# Patient Record
Sex: MALE | Race: WHITE | Employment: FULL TIME | URBAN - METROPOLITAN AREA
[De-identification: names, ages, dates, MRNs, and addresses within clinical notes are randomized per-mention and may not be internally consistent; named-entity substitution may affect disease eponyms.]

---
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
## 2023-03-15 ENCOUNTER — APPOINTMENT (OUTPATIENT)
Dept: GENERAL RADIOLOGY | Age: 23
End: 2023-03-15
Payer: COMMERCIAL

## 2023-03-15 ENCOUNTER — HOSPITAL ENCOUNTER (INPATIENT)
Age: 23
LOS: 1 days | Discharge: HOME | End: 2023-03-16
Attending: STUDENT IN AN ORGANIZED HEALTH CARE EDUCATION/TRAINING PROGRAM | Admitting: INTERNAL MEDICINE
Payer: COMMERCIAL

## 2023-03-15 DIAGNOSIS — I48.91 ATRIAL FIBRILLATION WITH RVR (HCC): Primary | ICD-10-CM

## 2023-03-15 DIAGNOSIS — R55 NEAR SYNCOPE: ICD-10-CM

## 2023-03-15 DIAGNOSIS — R42 LIGHTHEADEDNESS: ICD-10-CM

## 2023-03-15 PROBLEM — E11.9 DIABETES MELLITUS (HCC): Status: ACTIVE | Noted: 2023-03-15

## 2023-03-15 LAB
ALBUMIN SERPL-MCNC: 5.5 G/DL (ref 3.4–5)
ALBUMIN/GLOB SERPL: 2.9 {RATIO} (ref 1.1–2.2)
ALP SERPL-CCNC: 51 U/L (ref 40–129)
ALT SERPL-CCNC: 21 U/L (ref 10–40)
ANION GAP SERPL CALCULATED.3IONS-SCNC: 14 MMOL/L (ref 3–16)
AST SERPL-CCNC: 19 U/L (ref 15–37)
BASOPHILS # BLD: 0 K/UL (ref 0–0.2)
BASOPHILS NFR BLD: 0.5 %
BILIRUB SERPL-MCNC: 0.8 MG/DL (ref 0–1)
BUN SERPL-MCNC: 10 MG/DL (ref 7–20)
CALCIUM SERPL-MCNC: 10.6 MG/DL (ref 8.3–10.6)
CHLORIDE SERPL-SCNC: 99 MMOL/L (ref 99–110)
CHOLEST SERPL-MCNC: 174 MG/DL (ref 0–199)
CO2 SERPL-SCNC: 24 MMOL/L (ref 21–32)
CREAT SERPL-MCNC: 0.8 MG/DL (ref 0.9–1.3)
D DIMER: <0.27 UG/ML FEU (ref 0–0.6)
DEPRECATED RDW RBC AUTO: 13.5 % (ref 12.4–15.4)
EKG ATRIAL RATE: 182 BPM
EKG DIAGNOSIS: NORMAL
EKG Q-T INTERVAL: 210 MS
EKG QRS DURATION: 84 MS
EKG QTC CALCULATION (BAZETT): 362 MS
EKG R AXIS: 70 DEGREES
EKG T AXIS: 236 DEGREES
EKG VENTRICULAR RATE: 179 BPM
EOSINOPHIL # BLD: 0.1 K/UL (ref 0–0.6)
EOSINOPHIL NFR BLD: 0.7 %
GFR SERPLBLD CREATININE-BSD FMLA CKD-EPI: >60 ML/MIN/{1.73_M2}
GLUCOSE BLD-MCNC: 91 MG/DL (ref 70–99)
GLUCOSE SERPL-MCNC: 140 MG/DL (ref 70–99)
HCT VFR BLD AUTO: 51.8 % (ref 40.5–52.5)
HDLC SERPL-MCNC: 59 MG/DL (ref 40–60)
HGB BLD-MCNC: 17.3 G/DL (ref 13.5–17.5)
LDLC SERPL CALC-MCNC: 92 MG/DL
LYMPHOCYTES # BLD: 2.7 K/UL (ref 1–5.1)
LYMPHOCYTES NFR BLD: 33.1 %
MCH RBC QN AUTO: 28.5 PG (ref 26–34)
MCHC RBC AUTO-ENTMCNC: 33.5 G/DL (ref 31–36)
MCV RBC AUTO: 85.1 FL (ref 80–100)
MONOCYTES # BLD: 0.5 K/UL (ref 0–1.3)
MONOCYTES NFR BLD: 6.6 %
NEUTROPHILS # BLD: 4.9 K/UL (ref 1.7–7.7)
NEUTROPHILS NFR BLD: 59.1 %
NT-PROBNP SERPL-MCNC: 33 PG/ML (ref 0–124)
PERFORMED ON: NORMAL
PLATELET # BLD AUTO: 285 K/UL (ref 135–450)
PMV BLD AUTO: 8.7 FL (ref 5–10.5)
POTASSIUM SERPL-SCNC: 3.6 MMOL/L (ref 3.5–5.1)
PROT SERPL-MCNC: 7.4 G/DL (ref 6.4–8.2)
RBC # BLD AUTO: 6.08 M/UL (ref 4.2–5.9)
SODIUM SERPL-SCNC: 137 MMOL/L (ref 136–145)
TRIGL SERPL-MCNC: 117 MG/DL (ref 0–150)
TROPONIN T SERPL-MCNC: <0.01 NG/ML
TSH SERPL DL<=0.005 MIU/L-ACNC: 2.28 UIU/ML (ref 0.27–4.2)
TSH SERPL DL<=0.005 MIU/L-ACNC: 3.12 UIU/ML (ref 0.27–4.2)
VLDLC SERPL CALC-MCNC: 23 MG/DL
WBC # BLD AUTO: 8.3 K/UL (ref 4–11)

## 2023-03-15 PROCEDURE — 36415 COLL VENOUS BLD VENIPUNCTURE: CPT

## 2023-03-15 PROCEDURE — 2580000003 HC RX 258: Performed by: STUDENT IN AN ORGANIZED HEALTH CARE EDUCATION/TRAINING PROGRAM

## 2023-03-15 PROCEDURE — 83036 HEMOGLOBIN GLYCOSYLATED A1C: CPT

## 2023-03-15 PROCEDURE — 2580000003 HC RX 258: Performed by: INTERNAL MEDICINE

## 2023-03-15 PROCEDURE — 2500000003 HC RX 250 WO HCPCS: Performed by: INTERNAL MEDICINE

## 2023-03-15 PROCEDURE — 85379 FIBRIN DEGRADATION QUANT: CPT

## 2023-03-15 PROCEDURE — 6370000000 HC RX 637 (ALT 250 FOR IP): Performed by: INTERNAL MEDICINE

## 2023-03-15 PROCEDURE — 85025 COMPLETE CBC W/AUTO DIFF WBC: CPT

## 2023-03-15 PROCEDURE — 71045 X-RAY EXAM CHEST 1 VIEW: CPT

## 2023-03-15 PROCEDURE — 83880 ASSAY OF NATRIURETIC PEPTIDE: CPT

## 2023-03-15 PROCEDURE — 2500000003 HC RX 250 WO HCPCS: Performed by: STUDENT IN AN ORGANIZED HEALTH CARE EDUCATION/TRAINING PROGRAM

## 2023-03-15 PROCEDURE — 99223 1ST HOSP IP/OBS HIGH 75: CPT | Performed by: INTERNAL MEDICINE

## 2023-03-15 PROCEDURE — 6360000002 HC RX W HCPCS: Performed by: INTERNAL MEDICINE

## 2023-03-15 PROCEDURE — 93010 ELECTROCARDIOGRAM REPORT: CPT | Performed by: INTERNAL MEDICINE

## 2023-03-15 PROCEDURE — 96374 THER/PROPH/DIAG INJ IV PUSH: CPT

## 2023-03-15 PROCEDURE — 2060000000 HC ICU INTERMEDIATE R&B

## 2023-03-15 PROCEDURE — 99285 EMERGENCY DEPT VISIT HI MDM: CPT

## 2023-03-15 PROCEDURE — 84484 ASSAY OF TROPONIN QUANT: CPT

## 2023-03-15 PROCEDURE — 84443 ASSAY THYROID STIM HORMONE: CPT

## 2023-03-15 PROCEDURE — 93005 ELECTROCARDIOGRAM TRACING: CPT | Performed by: STUDENT IN AN ORGANIZED HEALTH CARE EDUCATION/TRAINING PROGRAM

## 2023-03-15 PROCEDURE — 80053 COMPREHEN METABOLIC PANEL: CPT

## 2023-03-15 PROCEDURE — 80061 LIPID PANEL: CPT

## 2023-03-15 RX ORDER — ACETAMINOPHEN 650 MG/1
650 SUPPOSITORY RECTAL EVERY 6 HOURS PRN
Status: DISCONTINUED | OUTPATIENT
Start: 2023-03-15 | End: 2023-03-16 | Stop reason: HOSPADM

## 2023-03-15 RX ORDER — DEXTROSE MONOHYDRATE 100 MG/ML
INJECTION, SOLUTION INTRAVENOUS CONTINUOUS PRN
Status: DISCONTINUED | OUTPATIENT
Start: 2023-03-15 | End: 2023-03-16 | Stop reason: HOSPADM

## 2023-03-15 RX ORDER — SODIUM CHLORIDE 0.9 % (FLUSH) 0.9 %
5-40 SYRINGE (ML) INJECTION PRN
Status: DISCONTINUED | OUTPATIENT
Start: 2023-03-15 | End: 2023-03-16 | Stop reason: HOSPADM

## 2023-03-15 RX ORDER — ONDANSETRON 2 MG/ML
4 INJECTION INTRAMUSCULAR; INTRAVENOUS EVERY 6 HOURS PRN
Status: DISCONTINUED | OUTPATIENT
Start: 2023-03-15 | End: 2023-03-16 | Stop reason: HOSPADM

## 2023-03-15 RX ORDER — LISINOPRIL 20 MG/1
20 TABLET ORAL DAILY
Status: ON HOLD | COMMUNITY
End: 2023-03-16 | Stop reason: HOSPADM

## 2023-03-15 RX ORDER — LISINOPRIL AND HYDROCHLOROTHIAZIDE 20; 12.5 MG/1; MG/1
1 TABLET ORAL DAILY
Status: DISCONTINUED | OUTPATIENT
Start: 2023-03-15 | End: 2023-03-15

## 2023-03-15 RX ORDER — INSULIN LISPRO 100 [IU]/ML
0-8 INJECTION, SOLUTION INTRAVENOUS; SUBCUTANEOUS
Status: DISCONTINUED | OUTPATIENT
Start: 2023-03-15 | End: 2023-03-15

## 2023-03-15 RX ORDER — ACETAMINOPHEN 325 MG/1
650 TABLET ORAL EVERY 6 HOURS PRN
Status: DISCONTINUED | OUTPATIENT
Start: 2023-03-15 | End: 2023-03-16 | Stop reason: HOSPADM

## 2023-03-15 RX ORDER — INSULIN LISPRO 100 [IU]/ML
0-4 INJECTION, SOLUTION INTRAVENOUS; SUBCUTANEOUS NIGHTLY
Status: DISCONTINUED | OUTPATIENT
Start: 2023-03-15 | End: 2023-03-15

## 2023-03-15 RX ORDER — POLYETHYLENE GLYCOL 3350 17 G/17G
17 POWDER, FOR SOLUTION ORAL DAILY PRN
Status: DISCONTINUED | OUTPATIENT
Start: 2023-03-15 | End: 2023-03-16 | Stop reason: HOSPADM

## 2023-03-15 RX ORDER — ENOXAPARIN SODIUM 150 MG/ML
1 INJECTION SUBCUTANEOUS 2 TIMES DAILY
Status: DISCONTINUED | OUTPATIENT
Start: 2023-03-15 | End: 2023-03-15

## 2023-03-15 RX ORDER — DILTIAZEM HYDROCHLORIDE 5 MG/ML
10 INJECTION INTRAVENOUS ONCE
Status: COMPLETED | OUTPATIENT
Start: 2023-03-15 | End: 2023-03-15

## 2023-03-15 RX ORDER — LISINOPRIL 20 MG/1
20 TABLET ORAL DAILY
Status: DISCONTINUED | OUTPATIENT
Start: 2023-03-15 | End: 2023-03-16 | Stop reason: HOSPADM

## 2023-03-15 RX ORDER — METFORMIN HYDROCHLORIDE 500 MG/1
500 TABLET, EXTENDED RELEASE ORAL 2 TIMES DAILY WITH MEALS
Status: DISCONTINUED | OUTPATIENT
Start: 2023-03-15 | End: 2023-03-15

## 2023-03-15 RX ORDER — ONDANSETRON 4 MG/1
4 TABLET, ORALLY DISINTEGRATING ORAL EVERY 8 HOURS PRN
Status: DISCONTINUED | OUTPATIENT
Start: 2023-03-15 | End: 2023-03-16 | Stop reason: HOSPADM

## 2023-03-15 RX ORDER — SODIUM CHLORIDE 0.9 % (FLUSH) 0.9 %
5-40 SYRINGE (ML) INJECTION EVERY 12 HOURS SCHEDULED
Status: DISCONTINUED | OUTPATIENT
Start: 2023-03-15 | End: 2023-03-16 | Stop reason: HOSPADM

## 2023-03-15 RX ORDER — DILTIAZEM HYDROCHLORIDE 120 MG/1
120 CAPSULE, COATED, EXTENDED RELEASE ORAL 2 TIMES DAILY
Status: DISCONTINUED | OUTPATIENT
Start: 2023-03-15 | End: 2023-03-16 | Stop reason: HOSPADM

## 2023-03-15 RX ORDER — SODIUM CHLORIDE 9 MG/ML
INJECTION, SOLUTION INTRAVENOUS PRN
Status: DISCONTINUED | OUTPATIENT
Start: 2023-03-15 | End: 2023-03-16 | Stop reason: HOSPADM

## 2023-03-15 RX ADMIN — ENOXAPARIN SODIUM 120 MG: 150 INJECTION SUBCUTANEOUS at 20:35

## 2023-03-15 RX ADMIN — ENOXAPARIN SODIUM 120 MG: 150 INJECTION SUBCUTANEOUS at 17:03

## 2023-03-15 RX ADMIN — DILTIAZEM HYDROCHLORIDE 120 MG: 120 CAPSULE, EXTENDED RELEASE ORAL at 21:48

## 2023-03-15 RX ADMIN — SODIUM CHLORIDE 12.5 MG/HR: 900 INJECTION, SOLUTION INTRAVENOUS at 18:13

## 2023-03-15 RX ADMIN — DILTIAZEM HYDROCHLORIDE 10 MG: 5 INJECTION INTRAVENOUS at 08:54

## 2023-03-15 RX ADMIN — Medication 10 ML: at 20:35

## 2023-03-15 RX ADMIN — SODIUM CHLORIDE 5 MG/HR: 900 INJECTION, SOLUTION INTRAVENOUS at 09:21

## 2023-03-15 RX ADMIN — ACETAMINOPHEN 325MG 650 MG: 325 TABLET ORAL at 12:55

## 2023-03-15 ASSESSMENT — PAIN - FUNCTIONAL ASSESSMENT: PAIN_FUNCTIONAL_ASSESSMENT: NONE - DENIES PAIN

## 2023-03-15 ASSESSMENT — LIFESTYLE VARIABLES: HOW OFTEN DO YOU HAVE A DRINK CONTAINING ALCOHOL: NEVER

## 2023-03-15 NOTE — ED NOTES
Pt Diltiazem rate titrated 10 mg/hr. Pt still complains of episodes of mild shortness of breath and some lightheadedness when moving around. VS remain stable outside of heart rate. Pt denies needs at this time.      Leanna Majano RN  03/15/23 4191

## 2023-03-15 NOTE — PROGRESS NOTES
4 Eyes Skin Assessment     The patient is being assess for  Admission    I agree that 2 RN's have performed a thorough Head to Toe Skin Assessment on the patient. ALL assessment sites listed below have been assessed. Areas assessed by both nurses: Flex Rosalestis &  [x]   Head, Face, and Ears   [x]   Shoulders, Back, and Chest  [x]   Arms, Elbows, and Hands   [x]   Coccyx, Sacrum, and Ischum  [x]   Legs, Feet, and Heels        Does the Patient have Skin Breakdown?   No         Qamar Prevention initiated:  No   Wound Care Orders initiated:  No      [facility] nurse consulted for Pressure Injury (Stage 3,4, Unstageable, DTI, NWPT, and Complex wounds):  No      Nurse 1 eSignature: Electronically signed by Justin Uribe RN on 3/15/23 at 3:55 PM EDT    **SHARE this note so that the co-signing nurse is able to place an eSignature**    Nurse 2 eSignature: Electronically signed by Anna Ahumada, RN on 3/15/23 at 7:31 PM EDT

## 2023-03-15 NOTE — ED PROVIDER NOTES
Emergency Department Provider Note  Location: [address] [place] [address]  ED  3/15/2023     Patient Identification  Ivan Orozco is a 23 y.o. male    Chief Complaint  Dizziness (Woke up with palpitations and dizziness, feeling sob, went to bed feeling fine)      Mode of Arrival  private car    HPI  (History provided by patient)  This is a 23 y.o. male with a PMH significant for diabetes, hypertension  presented today for palpitations and dizziness. Patient reports that symptoms started around 7:00 when he woke up this morning. States that his heart rate felt irregular. He reports presyncopal symptoms but denies any syncope. He is endorsing some chest pressure but denies any shortness of breath. States that dizziness described as lightheadedness. He denies any recent fever, cough, abdominal pain, vomiting, or changes to bowel or bladder. Patient reports that he smoked marijuana earlier this evening which is the only thing that is changed. He also reports increasing stress at home. ROS  Review of Systems   All other systems reviewed and are negative. I have reviewed the following nursing documentation:  Allergies: Allergies   Allergen Reactions    Pistachio [Macadamia Nut Oil] Hives    Topiramate     Lansoprazole Rash       Past medical history:  has a past medical history of Diabetes mellitus ([address] and Hypertension. Past surgical history:  has a past surgical history that includes fracture surgery; Superior tooth extraction; and Gastric Band. Home medications:   Prior to Admission medications    Medication Sig Start Date End Date Taking?  Authorizing Provider   lisinopril-hydroCHLOROthiazide (PRINZIDE;ZESTORETIC) 20-12.5 MG per tablet  3/17/21   Historical Provider, MD   metFORMIN (GLUCOPHAGE-XR) 500 MG extended release tablet  3/7/21   Historical Provider, MD   omeprazole (PRILOSEC) 20 MG delayed release capsule Take 20 mg by mouth 2 times daily 1/20/21 1/20/22  Historical Provider, MD ondansetron (ZOFRAN ODT) 4 MG disintegrating tablet Take 1 tablet by mouth every 8 hours as needed for Nausea 3/25/21   Bhargavi Pan APRN - CNP   ibuprofen (ADVIL;MOTRIN) 800 MG tablet Take 1 tablet by mouth every 6 hours as needed for Pain 9/22/16 9/27/16  Marylene Cree, MD       Social history:  reports that he has never smoked. He has never used smokeless tobacco. He reports that he does not drink alcohol and does not use drugs. Family history:  History reviewed. No pertinent family history. Exam  ED Triage Vitals   BP Temp Temp src Pulse Resp SpO2 Height Weight   -- -- -- -- -- -- -- --     Physical Exam  Vitals and nursing note reviewed. Constitutional:       General: He is not in acute distress. HENT:      Head: Normocephalic and atraumatic. Right Ear: External ear normal.      Left Ear: External ear normal.      Nose: Nose normal.      Mouth/Throat:      Pharynx: Oropharynx is clear. Eyes:      Conjunctiva/sclera: Conjunctivae normal.   Cardiovascular:      Rate and Rhythm: Tachycardia present. Rhythm irregular. Pulses: Normal pulses. Heart sounds: Murmur heard. Pulmonary:      Effort: Pulmonary effort is normal.      Breath sounds: Normal breath sounds. Abdominal:      General: There is no distension. Palpations: Abdomen is soft. Tenderness: There is no abdominal tenderness. There is no guarding or rebound. Musculoskeletal:         General: Normal range of motion. Cervical back: Neck supple. No rigidity. Skin:     General: Skin is warm. Capillary Refill: Capillary refill takes less than 2 seconds. Neurological:      General: No focal deficit present. Mental Status: He is alert. Cranial Nerves: No cranial nerve deficit.    Psychiatric:         Mood and Affect: Mood normal.         Behavior: Behavior normal.           MDM/ED Course  ED Medication Orders (From admission, onward)      Start Ordered     Status Ordering Provider    03/15/23 0900 03/15/23 0848  dilTIAZem injection 10 mg  ONCE        See Hyperspace for full Linked Orders Report. Last MAR action: Given - by Alexis Burks on 03/15/23 at [address]    03/15/23 0900 03/15/23 0848  dilTIAZem 125 mg in sodium chloride 0.9 % 125 mL infusion  CONTINUOUS        Question Answer Comment   Titrate Infusion? Yes    Initial Infusion Dose: 5 mg/hr    Goal of Therapy is: HR less than 120 bpm    Contact Provider if: SBP less than 90 mmHg    Contact Provider if: HR less than 60 bpm    HOLD for SBP less than 90 mmHg    HOLD for HR less than 60 bpm       See Hyperspace for full Linked Orders Report. Last MAR action: Canceled Entry - by Alexis Burks on 03/15/23 at [address]    03/15/23 0900 03/15/23 0858  dilTIAZem 100 mg in sodium chloride 0.9 % 100 mL infusion (ADD-[place])  CONTINUOUS        Question Answer Comment   Titrate Infusion? Yes    Initial Infusion Dose: 5 mg/hr    Goal of Therapy is: HR less than 120 bpm    Contact Provider if: SBP less than 90 mmHg    Contact Provider if: HR less than 60 bpm    HOLD for SBP less than 90 mmHg    HOLD for HR less than 60 bpm        Last MAR action: Rate/Dose Change - by Alexis Burks on 03/15/23 at [address]            EKG    Interpreted the EKG and note atrial fibrillation with rapid ventricular response, rate of 179, normal QRS and QTc, ST and T wave abnormalities in the inferior lateral leads. Radiology  XR CHEST PORTABLE    Result Date: 3/15/2023  EXAMINATION: ONE XRAY VIEW OF THE CHEST 3/15/2023 8:50 am COMPARISON: None. HISTORY: ORDERING SYSTEM PROVIDED HISTORY: palpitations TECHNOLOGIST PROVIDED HISTORY: Reason for exam:->palpitations Reason for Exam: palpitations, dizziness, near syncope, pre-existing heart conditions FINDINGS: Normal cardiomediastinal silhouette. No acute airspace infiltrate.   No pneumothorax or pleural effusion     No acute cardiopulmonary findings        Labs  Results for orders placed or performed during the hospital encounter of 03/15/23   CBC with Auto Differential   Result Value Ref Range    WBC 8.3 4.0 - 11.0 K/uL    RBC 6.08 (H) 4.20 - 5.90 M/uL    Hemoglobin 17.3 13.5 - 17.5 g/dL    Hematocrit 51.8 40.5 - 52.5 %    MCV 85.1 80.0 - 100.0 fL    MCH 28.5 26.0 - 34.0 pg    MCHC 33.5 31.0 - 36.0 g/dL    RDW 13.5 12.4 - 15.4 %    Platelets 083 873 - 193 K/uL    MPV 8.7 5.0 - 10.5 fL    Neutrophils % 59.1 %    Lymphocytes % 33.1 %    Monocytes % 6.6 %    Eosinophils % 0.7 %    Basophils % 0.5 %    Neutrophils Absolute 4.9 1.7 - 7.7 K/uL    Lymphocytes Absolute 2.7 1.0 - 5.1 K/uL    Monocytes Absolute 0.5 0.0 - 1.3 K/uL    Eosinophils Absolute 0.1 0.0 - 0.6 K/uL    Basophils Absolute 0.0 0.0 - 0.2 K/uL   Comprehensive Metabolic Panel   Result Value Ref Range    Sodium 137 136 - 145 mmol/L    Potassium 3.6 3.5 - 5.1 mmol/L    Chloride 99 99 - 110 mmol/L    CO2 24 21 - 32 mmol/L    Anion Gap 14 3 - 16    Glucose 140 (H) 70 - 99 mg/dL    BUN 10 7 - 20 mg/dL    Creatinine 0.8 (L) 0.9 - 1.3 mg/dL    Est, Glom Filt Rate >60 >60    Calcium 10.6 8.3 - 10.6 mg/dL    Total Protein 7.4 6.4 - 8.2 g/dL    Albumin 5.5 (H) 3.4 - 5.0 g/dL    Albumin/Globulin Ratio 2.9 (H) 1.1 - 2.2    Total Bilirubin 0.8 0.0 - 1.0 mg/dL    Alkaline Phosphatase 51 40 - 129 U/L    ALT 21 10 - 40 U/L    AST 19 15 - 37 U/L   Troponin   Result Value Ref Range    Troponin <0.01 <0.01 ng/mL   Brain Natriuretic Peptide   Result Value Ref Range    Pro-BNP 33 0 - 124 pg/mL   D-Dimer, Quantitative   Result Value Ref Range    D-Dimer, Quant <0.27 0.00 - 0.60 ug/mL FEU   EKG 12 Lead   Result Value Ref Range    Ventricular Rate 179 BPM    Atrial Rate 182 BPM    QRS Duration 84 ms    Q-T Interval 210 ms    QTc Calculation (Bazett) 362 ms    R Axis 70 degrees    T Axis 236 degrees    Diagnosis       Atrial fibrillation with rapid ventricular responseST & T wave abnormality, consider inferolateral ischemia or digitalis effectAbnormal ECGNo previous ECGs available         - Patient seen and evaluated in room 02.  23 y.o. male presented for palpitations and dizziness. Patient presented tachycardic to 181, normotensive, afebrile, normal respiratory rate and O2 saturation. On exam he had an irregular rhythm and was tachycardic. Given history and exam my differential diagnosis includes but is not limited to ACS, cardiomyopathy, CHF, pneumonia, pleural effusion, dehydration, electrolyte abnormalities, anemia, medication side effect. He has no ripping or tearing chest pain to the disability acute aortic dissection. I also considered underlying PE with D-dimer pending.  - Patient was placed on telemetry during his ED stay. He was initially found to be in atrial fibrillation.  - Pertinent old records reviewed. Patient is followed by Dr. Christofer Morales at [facility] cardiology. 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 He has access to transportation. Patient has a primary care provider  - Patient was given diltiazem bolus and infusion in the ED.   Upon reassessment, patient improved heart rate to 123.    - Diagnostic studies reviewed and interpreted by me   - EKG atrial fibrillation with rapid ventricular response, rate of 179, normal QRS and QTc, ST and T wave abnormalities in the inferior lateral leads.   - CXR    - Lab:    CBC with a normal white blood cell count, no evidence of anemia, normal platelets  CMP with normal electrolytes, normal renal function, normal LFTs and bilirubin  Normal proBNP  Troponin negative  D-dimer within normal limits    I discussed case with Dr. Radha Pelaez with [facility] cardiology. He was okay with admission here at [facility]. No further medication recommended. - I discussed the results with patient and family member patient and mother. We agreed to admit for acute onset atrial fibrillation with rapid ventricular response. Clinical Impression:  1. Atrial fibrillation with RVR ([address]    2. Near syncope    3. Lightheadedness          Disposition:  Admit to telemetry in stable condition. Blood pressure (!) 124/90, pulse (!) 128, temperature 97 °F (36.1 °C), temperature source Oral, resp. rate 18, weight 255 lb (115.7 kg), SpO2 96 %. Patient was given scripts for the following medications. I counseled patient how to take these medications. New Prescriptions    No medications on file       Disposition referral (if applicable):  No follow-up provider specified. Total critical care time is 32 minutes, which excludes separately billable procedures and updating family. Time spent is specifically for management of the presenting complaint and symptoms initially, direct bedside care, reevaluation, review of records, and consultation. There was a high probability of clinically significant life-threatening deterioration in the patient's condition, which required my urgent intervention. This chart was generated in part by using Dragon Dictation system and may contain errors related to that system including errors in grammar, punctuation, and spelling, as well as words and phrases that may be inappropriate. If there are any questions or concerns please feel free to contact the dictating provider for clarification.      Suki Choi MD  [address] [place] [address]        Suki Choi MD  03/15/23 5380

## 2023-03-15 NOTE — H&P
Hospital Medicine History & Physical      PCP: Alban Grewal    Date of Admission: 3/15/2023    Date of Service: Pt seen/examined on 03/15/23 and Admitted to Inpatient with expected LOS greater than two midnights due to medical therapy of new onset atrial fibrillation with rapid ventricular response. Chief Complaint: Dizziness and palpitations      History Of Present Illness:       23 y.o. male who presented to [facility] with dizziness and palpitations when he woke up around 7 AM.  Pulse felt irregular when he noticed that he was dizzy. Felt like he was passing out but he did not actually pass out. Had some chest pressure. No shortness of breath at rest or with exertion. No recent sick exposure, no fever cough abdominal pain nausea or vomiting. Smoked marijuana night before  Has some congenital cardiac issues, none severe. Follows with a cardiologist at the [address] [place] [address] he may have a cardiac valve condition, but not sure if that involves mitral valve. Also has diabetes and hypertension in addition to obesity. Had gastric band surgery. No other accompanying symptoms  Nothing else that makes the patient feel better or worse. Past Medical History:          Diagnosis Date    Diabetes mellitus ([address]     Hypertension        Past Surgical History:          Procedure Laterality Date    FRACTURE SURGERY      GASTRIC BAND      WISDOM TOOTH EXTRACTION         Medications Prior to Admission:      Prior to Admission medications    Medication Sig Start Date End Date Taking?  Authorizing Provider   lisinopril-hydroCHLOROthiazide (PRINZIDE;ZESTORETIC) 20-12.5 MG per tablet  3/17/21   Historical Provider, MD   metFORMIN (GLUCOPHAGE-XR) 500 MG extended release tablet  3/7/21   Historical Provider, MD   omeprazole (PRILOSEC) 20 MG delayed release capsule Take 20 mg by mouth 2 times daily 1/20/21 1/20/22  Historical Provider, MD   ondansetron (ZOFRAN ODT) 4 MG disintegrating tablet Take 1 tablet by mouth every 8 hours as needed for Nausea 3/25/21   Skye Flower APRN - CNP   ibuprofen (ADVIL;MOTRIN) 800 MG tablet Take 1 tablet by mouth every 6 hours as needed for Pain 9/22/16 9/27/16  Anirudh Witt MD       Allergies:  Nadya Perking nut oil], Topiramate, and Lansoprazole    Social History:      The patient currently lives at home with family    TOBACCO:   reports that he has never smoked. He has never used smokeless tobacco.  ETOH:   reports no history of alcohol use. E-cigarette/Vaping       Questions Responses    E-cigarette/Vaping Use     Start Date     Passive Exposure     Quit Date     Counseling Given     Comments           Smokes marijuana    Family History:    Reviewed and negative in regards to presenting illness/complaint. History reviewed. No pertinent family history. REVIEW OF SYSTEMS COMPLETED:   Pertinent positives as noted in the HPI. Palpitation, dizziness. All other systems reviewed and negative. PHYSICAL EXAM PERFORMED:    BP (!) 156/97   Pulse (!) 123   Temp 97 °F (36.1 °C) (Oral)   Resp 12   Wt 255 lb (115.7 kg)   SpO2 100%   BMI 31.87 kg/m²     General appearance:  No apparent distress, appears stated age and cooperative. HEENT:  Normal cephalic, atraumatic without obvious deformity. Pupils equal, round, and reactive to light. Extra ocular muscles intact. Conjunctivae/corneas clear. Neck: Supple, with full range of motion. No jugular venous distention. Trachea midline. Respiratory:  Normal respiratory effort. Clear to auscultation, bilaterally without Rales/Wheezes/Rhonchi. Cardiovascular: Tachycardic, irregularly irregular rhythm with normal S1/S2 without murmurs, rubs or gallops. Abdomen: Soft, non-tender, non-distended with normal bowel sounds. Musculoskeletal:  No clubbing, cyanosis or edema bilaterally. Full range of motion without deformity. Skin: Skin color, texture, turgor normal.  No rashes or lesions.   Neurologic: Neurovascularly intact without any focal sensory/motor deficits. Cranial nerves: II-XII intact, grossly non-focal.  Psychiatric:  Alert and oriented, thought content appropriate, normal insight  Capillary Refill: Brisk,3 seconds, normal  Peripheral Pulses: +2 palpable, equal bilaterally       Labs:     Recent Labs     03/15/23  0843   WBC 8.3   HGB 17.3   HCT 51.8        Recent Labs     03/15/23  0843      K 3.6   CL 99   CO2 24   BUN 10   CREATININE 0.8*   CALCIUM 10.6     Recent Labs     03/15/23  0843   AST 19   ALT 21   BILITOT 0.8   ALKPHOS 51     No results for input(s): INR in the last 72 hours. Recent Labs     03/15/23  0843   TROPONINI <0.01       Urinalysis:      Lab Results   Component Value Date/Time    NITRU Negative 03/25/2021 11:32 AM    WBCUA 0-2 03/25/2021 11:32 AM    RBCUA 0-2 03/25/2021 11:32 AM    BLOODU Negative 03/25/2021 11:32 AM    SPECGRAV >=1.030 03/25/2021 11:32 AM    GLUCOSEU >=1000 03/25/2021 11:32 AM       Radiology:     CXR: I have reviewed the CXR with the following interpretation: No acute changes  EKG:  I have reviewed the EKG with the following interpretation: Atrial fibrillation with rapid ventricular response    XR CHEST PORTABLE   Final Result   No acute cardiopulmonary findings             Consults:    IP CONSULT TO CARDIOLOGY  IP CONSULT TO CARDIOLOGY    ASSESSMENT:    Active Hospital Problems    Diagnosis Date Noted    Diabetes mellitus ([facility] [E11.9] 03/15/2023     Priority: Medium    Atrial fibrillation with rapid ventricular response ([facility] [I48.91] 03/15/2023     Priority: Medium         PLAN:    Atrial fibrillation with rapid ventricular response  New onset. Previously unknown. Cardiology consulted. Started on diltiazem drip for rate control. Patient is known to have underlying cardiac congenital diseases. Unclear if patient has mitral valve disease. Echocardiogram ordered. TSH is also ordered.   Cardiac electrophysiology to determine next course of action including long-term anticoagulation. For now, I will start the patient on therapeutic Lovenox pending cardiac electrophysiology opinion. Patient has no symptoms at the time of the visit. [id number] Score for Atrial Fibrillation Stroke Risk   Risk   Factors  Component Value   C CHF No 0   H HTN No 0   A2 Age >= 76 No,  (23 y.o.) 0   D DM Yes 1   S2 Prior Stroke/TIA No 0   V Vascular Disease No 0   A Age 74-69 No,  (27 y.o.) 0   Sc Sex male 0    [id number]  Score  1   Score last updated 3/15/23 81:54 AM EDT    Click here for a link to the UpToDate guideline \"Atrial Fibrillation: Anticoagulation therapy to prevent embolization    Disclaimer: Risk Score calculation is dependent on accuracy of patient problem list and past encounter diagnosis. Hypertension  Antiarrhythmic management may affect patient's antihypertensive management as usually many antiarrhythmic medications are also antihypertensives. Today's blood pressure is normal and controlled. No changes in medical management for now unless cardiology wants to make changes. Diabetes mellitus type 2  Continue home dose of metformin in addition to carb controlled diet and monitor blood sugars per protocol. Adding hypoglycemic protocol as well. Hemoglobin A1c pending. Obesity  Body mass index is 31.87 kg/m². Complicating assessment and treatment, placing patient at high risk for multiple co-morbidities as well as early death and contributing to the patient's presentation. Counseled on weight loss. DVT Prophylaxis: Therapeutic Lovenox  Diet: Carb controlled diet  Code Status: Full code    PT/OT Eval Status: Not indicated. Active and ambulatory    Dispo -inpatient stay pending cardiac electrophysiology opinion and clinical improvement. Tejinder Ricketts MD    Thank you [facility] for the opportunity to be involved in this patient's care.  If you have any questions or concerns please feel free to contact me at [phone].

## 2023-03-15 NOTE — LETTER
March 16, 2023       Yvonnerenee Nielsenricarda YOB: 2000   [place] [address] Date of Visit:  3/15/2023       To Whom It May Concern: It is my medical opinion that Clifton Shah may return to work on 3/20/2023. If you have any questions or concerns, please don't hesitate to call.     Sincerely,        Cheryle Mattock, CNP

## 2023-03-15 NOTE — ED NOTES
@4314 Called Cardio per   RE:New Afib- Followed by [facility]iology Dr. Deepthi Johnson  @4123 Janki Gong spoke to Job Fort

## 2023-03-15 NOTE — PROGRESS NOTES

## 2023-03-16 ENCOUNTER — TELEPHONE (OUTPATIENT)
Dept: CARDIOLOGY | Age: 23
End: 2023-03-16

## 2023-03-16 VITALS
TEMPERATURE: 98.8 F | BODY MASS INDEX: 31.71 KG/M2 | RESPIRATION RATE: 16 BRPM | HEIGHT: 75 IN | DIASTOLIC BLOOD PRESSURE: 62 MMHG | OXYGEN SATURATION: 98 % | SYSTOLIC BLOOD PRESSURE: 108 MMHG | HEART RATE: 73 BPM | WEIGHT: 255 LBS

## 2023-03-16 LAB
ALBUMIN SERPL-MCNC: 5 G/DL (ref 3.4–5)
ALBUMIN/GLOB SERPL: 2.4 {RATIO} (ref 1.1–2.2)
ALP SERPL-CCNC: 48 U/L (ref 40–129)
ALT SERPL-CCNC: 18 U/L (ref 10–40)
ANION GAP SERPL CALCULATED.3IONS-SCNC: 10 MMOL/L (ref 3–16)
AST SERPL-CCNC: 16 U/L (ref 15–37)
BILIRUB SERPL-MCNC: 1.1 MG/DL (ref 0–1)
BUN SERPL-MCNC: 11 MG/DL (ref 7–20)
CALCIUM SERPL-MCNC: 10.4 MG/DL (ref 8.3–10.6)
CHLORIDE SERPL-SCNC: 102 MMOL/L (ref 99–110)
CO2 SERPL-SCNC: 29 MMOL/L (ref 21–32)
CREAT SERPL-MCNC: 0.9 MG/DL (ref 0.9–1.3)
EKG ATRIAL RATE: 82 BPM
EKG ATRIAL RATE: 88 BPM
EKG DIAGNOSIS: NORMAL
EKG DIAGNOSIS: NORMAL
EKG P AXIS: 61 DEGREES
EKG P AXIS: 72 DEGREES
EKG P-R INTERVAL: 140 MS
EKG P-R INTERVAL: 142 MS
EKG Q-T INTERVAL: 352 MS
EKG Q-T INTERVAL: 360 MS
EKG QRS DURATION: 100 MS
EKG QRS DURATION: 102 MS
EKG QTC CALCULATION (BAZETT): 420 MS
EKG QTC CALCULATION (BAZETT): 425 MS
EKG R AXIS: 49 DEGREES
EKG R AXIS: 59 DEGREES
EKG T AXIS: 54 DEGREES
EKG T AXIS: 55 DEGREES
EKG VENTRICULAR RATE: 82 BPM
EKG VENTRICULAR RATE: 88 BPM
EST. AVERAGE GLUCOSE BLD GHB EST-MCNC: 93.9 MG/DL
EST. AVERAGE GLUCOSE BLD GHB EST-MCNC: 93.9 MG/DL
GFR SERPLBLD CREATININE-BSD FMLA CKD-EPI: >60 ML/MIN/{1.73_M2}
GLUCOSE BLD-MCNC: 90 MG/DL (ref 70–99)
GLUCOSE SERPL-MCNC: 99 MG/DL (ref 70–99)
HBA1C MFR BLD: 4.9 %
HBA1C MFR BLD: 4.9 %
INR PPP: 1.02 (ref 0.87–1.14)
PERFORMED ON: NORMAL
POTASSIUM SERPL-SCNC: 3.9 MMOL/L (ref 3.5–5.1)
PROT SERPL-MCNC: 7.1 G/DL (ref 6.4–8.2)
PROTHROMBIN TIME: 13.3 SEC (ref 11.7–14.5)
SODIUM SERPL-SCNC: 141 MMOL/L (ref 136–145)

## 2023-03-16 PROCEDURE — 85610 PROTHROMBIN TIME: CPT

## 2023-03-16 PROCEDURE — 6370000000 HC RX 637 (ALT 250 FOR IP): Performed by: INTERNAL MEDICINE

## 2023-03-16 PROCEDURE — 99233 SBSQ HOSP IP/OBS HIGH 50: CPT

## 2023-03-16 PROCEDURE — 80053 COMPREHEN METABOLIC PANEL: CPT

## 2023-03-16 PROCEDURE — 93010 ELECTROCARDIOGRAM REPORT: CPT | Performed by: INTERNAL MEDICINE

## 2023-03-16 PROCEDURE — 83036 HEMOGLOBIN GLYCOSYLATED A1C: CPT

## 2023-03-16 PROCEDURE — 93005 ELECTROCARDIOGRAM TRACING: CPT | Performed by: INTERNAL MEDICINE

## 2023-03-16 PROCEDURE — 36415 COLL VENOUS BLD VENIPUNCTURE: CPT

## 2023-03-16 RX ORDER — DILTIAZEM HYDROCHLORIDE 120 MG/1
120 CAPSULE, COATED, EXTENDED RELEASE ORAL DAILY
Qty: 30 CAPSULE | Refills: 3 | Status: SHIPPED | OUTPATIENT
Start: 2023-03-16

## 2023-03-16 RX ORDER — SODIUM CHLORIDE 9 MG/ML
INJECTION, SOLUTION INTRAVENOUS PRN
Status: DISCONTINUED | OUTPATIENT
Start: 2023-03-16 | End: 2023-03-16 | Stop reason: HOSPADM

## 2023-03-16 RX ORDER — LORAZEPAM 0.5 MG/1
0.5 TABLET ORAL
Status: DISCONTINUED | OUTPATIENT
Start: 2023-03-16 | End: 2023-03-16 | Stop reason: HOSPADM

## 2023-03-16 RX ORDER — SODIUM CHLORIDE 0.9 % (FLUSH) 0.9 %
5-40 SYRINGE (ML) INJECTION EVERY 12 HOURS SCHEDULED
Status: DISCONTINUED | OUTPATIENT
Start: 2023-03-16 | End: 2023-03-16 | Stop reason: HOSPADM

## 2023-03-16 RX ORDER — DILTIAZEM HYDROCHLORIDE 120 MG/1
120 CAPSULE, COATED, EXTENDED RELEASE ORAL DAILY
Qty: 30 CAPSULE | Refills: 3 | Status: SHIPPED | OUTPATIENT
Start: 2023-03-16 | End: 2023-03-16 | Stop reason: SDUPTHER

## 2023-03-16 RX ORDER — SODIUM CHLORIDE 0.9 % (FLUSH) 0.9 %
5-40 SYRINGE (ML) INJECTION PRN
Status: DISCONTINUED | OUTPATIENT
Start: 2023-03-16 | End: 2023-03-16 | Stop reason: HOSPADM

## 2023-03-16 RX ORDER — ONDANSETRON 2 MG/ML
4 INJECTION INTRAMUSCULAR; INTRAVENOUS EVERY 6 HOURS PRN
Status: DISCONTINUED | OUTPATIENT
Start: 2023-03-16 | End: 2023-03-16 | Stop reason: SDUPTHER

## 2023-03-16 RX ADMIN — ACETAMINOPHEN 325MG 650 MG: 325 TABLET ORAL at 07:51

## 2023-03-16 RX ADMIN — DILTIAZEM HYDROCHLORIDE 120 MG: 120 CAPSULE, EXTENDED RELEASE ORAL at 06:22

## 2023-03-16 RX ADMIN — RIVAROXABAN 20 MG: 20 TABLET, FILM COATED ORAL at 09:22

## 2023-03-16 RX ADMIN — LISINOPRIL 20 MG: 20 TABLET ORAL at 09:22

## 2023-03-16 ASSESSMENT — PAIN - FUNCTIONAL ASSESSMENT: PAIN_FUNCTIONAL_ASSESSMENT: ACTIVITIES ARE NOT PREVENTED

## 2023-03-16 ASSESSMENT — PAIN SCALES - GENERAL: PAINLEVEL_OUTOF10: 3

## 2023-03-16 ASSESSMENT — PAIN DESCRIPTION - ORIENTATION: ORIENTATION: POSTERIOR

## 2023-03-16 ASSESSMENT — PAIN DESCRIPTION - LOCATION: LOCATION: HEAD

## 2023-03-16 ASSESSMENT — PAIN DESCRIPTION - DESCRIPTORS: DESCRIPTORS: ACHING

## 2023-03-16 NOTE — DISCHARGE SUMMARY
Hospital Medicine Discharge Summary    Patient ID: Joey Markham      Patient's PCP: Hal Guzman Date: 3/15/2023     Discharge Date:   03/16/23     Admitting Provider: Kelsi Yepez MD     Discharge Provider: Doreen Gates MD     Discharge Diagnoses: Active Hospital Problems    Diagnosis     Diabetes mellitus ([facility] [E11.9]      Priority: Medium    Atrial fibrillation with rapid ventricular response ([facility] [I48.91]      Priority: Medium       The patient was seen and examined on day of discharge and this discharge summary is in conjunction with any daily progress note from day of discharge. Hospital Course:     Presented with fast heart rate and dizziness. Evaluated in the ED and noted to have atrial fibrillation with rapid ventricular response. This is a new condition for the patient. Patient does have congenital cardiac issues. However never was diagnosed with arrhythmia. Started on diltiazem drip. Cardioversion was planned but patient spontaneously converted to normal sinus rhythm. Procedure was canceled. On the day of discharge, patient was seen by cardiac electrophysiology. Medications were adjusted as below. Lisinopril was stopped. Outpatient work-up is recommended at this time. Started on Xarelto for CVA prevention. Follow-up with cardiology        Physical Exam Performed:     /62   Pulse 73   Temp 98.8 °F (37.1 °C) (Oral)   Resp 16   Ht 6' 3\" (1.905 m)   Wt 255 lb (115.7 kg)   SpO2 98%   BMI 31.87 kg/m²       General appearance:  No apparent distress, appears stated age and cooperative. HEENT:  Normal cephalic, atraumatic without obvious deformity. Pupils equal, round, and reactive to light. Extra ocular muscles intact. Conjunctivae/corneas clear. Neck: Supple, with full range of motion. No jugular venous distention. Trachea midline. Respiratory:  Normal respiratory effort.  Clear to auscultation, bilaterally without Rales/Wheezes/Rhonchi. Cardiovascular:  Regular rate and rhythm with normal S1/S2 without murmurs, rubs or gallops. Abdomen: Soft, non-tender, non-distended with normal bowel sounds. Musculoskeletal:  No clubbing, cyanosis or edema bilaterally. Full range of motion without deformity. Skin: Skin color, texture, turgor normal.  No rashes or lesions. Neurologic:  Neurovascularly intact without any focal sensory/motor deficits. Cranial nerves: II-XII intact, grossly non-focal.  Psychiatric:  Alert and oriented, thought content appropriate, normal insight  Capillary Refill: Brisk,< 3 seconds   Peripheral Pulses: +2 palpable, equal bilaterally       Labs:  For convenience and continuity at follow-up the following most recent labs are provided:      CBC:    Lab Results   Component Value Date/Time    WBC 8.3 03/15/2023 08:43 AM    HGB 17.3 03/15/2023 08:43 AM    HCT 51.8 03/15/2023 08:43 AM     03/15/2023 08:43 AM       Renal:    Lab Results   Component Value Date/Time     03/16/2023 06:28 AM    K 3.9 03/16/2023 06:28 AM     03/16/2023 06:28 AM    CO2 29 03/16/2023 06:28 AM    BUN 11 03/16/2023 06:28 AM    CREATININE 0.9 03/16/2023 06:28 AM    CALCIUM 10.4 03/16/2023 06:28 AM         Significant Diagnostic Studies    Radiology:   XR CHEST PORTABLE   Final Result   No acute cardiopulmonary findings                Consults:     IP CONSULT TO CARDIOLOGY  IP CONSULT TO CARDIOLOGY    Disposition: Home    Condition at Discharge: Stable    Discharge Instructions/Follow-up: PCP, cardiology    Code Status:  Full Code     Activity: activity as tolerated    Diet: regular diet      Discharge Medications:     Current Discharge Medication List             Details   dilTIAZem (CARDIZEM CD) 120 MG extended release capsule Take 1 capsule by mouth daily  Qty: 30 capsule, Refills: 3      rivaroxaban (XARELTO) 20 MG TABS tablet Take 1 tablet by mouth daily  Qty: 30 tablet, Refills: 0                Details   ibuprofen (ADVIL;MOTRIN) 800 MG tablet Take 1 tablet by mouth every 6 hours as needed for Pain  Qty: 20 tablet, Refills: 0             Time Spent on discharge: 42 minutes in the examination, evaluation, counseling and review of medications and discharge plan. Signed:    Margarita Pearce MD   3/16/2023      Thank you Alban Grewal for the opportunity to be involved in this patient's care. If you have any questions or concerns, please feel free to contact me at [phone].

## 2023-03-16 NOTE — TELEPHONE ENCOUNTER
Monitor company [employer]  Length of monitor 28days  Monitor ordered by Weston Guo MD  Patch ID [id number]  Device number [id number]  Monitor given to Lizeth Colunga RN.   Nurse to apply at the time of discharge.

## 2023-03-16 NOTE — PROGRESS NOTES
PIV removed. Tip intact. Dressing in place. Tele box removed. [facility] notified of pt discharge. Discharge paperwork went over with and given to pt. Verbalizes understanding. Pt aware of prescriptions to be picked up at pharmacy. Work release letter given to pt. Pt discharge home in stable condition.

## 2023-03-16 NOTE — CONSULTS
Electrophysiology Consultation   Date: 3/15/2023  Admit Date:  3/15/2023  Reason for Consultation: New onset atrial fibrillation  Consult Requesting Physician: Nura Valentien MD     Chief Complaint   Patient presents with    Dizziness     Woke up with palpitations and dizziness, feeling sob, went to bed feeling fine     HPI:   Mr. Tova Penaloza is a pleasant 23year old male with a  medical history significant for congenital heart disease (dysplastic tricuspid valve, right ventricular hypertension, LVOT sub-aortic ridge, and ventricular septal defect), hypertension, diabetes mellitus type II, morbid obesity status post gastric bypass, fatty liver, and obstructive sleep apnea who presents from home with new onset atrial fibrillation with RVR. According to patient he was in his usual state of health until this morning when he awoke with palpitations. He tried to go to work and as he exited his car became lightheaded and dizzy. He is able to use his car to stand up. He is able to get into his place of business and a coworker saw patient and brought him to the emergency room for further evaluation and care. Of note, patient has a history of congenital heart disease and was diagnosed at age 14 from a cardiac murmur. Patient lives with his girlfriend. He denies tobacco use. He is a social drinker. Patient denies fevers, chest pain, orthopnea, PND, lower extremity edema, abdominal swelling, shortness of breath, dyspnea on exertion, chills, visual changes, headaches, sore throat, cough, abdominal pain, nausea, vomiting, bleeding, bruising, dysuria, muscle/joint pain, confusion, depression, anxiety, skin lesions, etc.    Emergency Room/Hospital Course:  Patient was evaluated emergency room on 03/15/2023. His CMP was reassuring. His troponin enzymes were negative x3. His CBC was reassuring. His UA was notable for glucose. His TSH/free T4 was within normal meds. His EKG showed atrial fibrillation with RVR. No sign of ongoing ischemia. Nonspecific T wave changes. Chest radiograph is reassuring. Current rhythm: Atrial fibrillation  Known history of atrial fibrillation: no  Valvular disease: Yes  Associated symptoms: palpitations  Heart rate is  controlled  Previous cardioversion and/or ablation: no  History of CAD: No  History of sleep apnea: Yes  History of ETOH abuse/drug abuse: No  History of thyroid disease: No  Elevated BNP: No  Left atrial size is Abnormal  mildly dilated    Past Medical History:   Diagnosis Date    Diabetes mellitus ([facility] [address]     Hypertension         Past Surgical History:   Procedure Laterality Date    FRACTURE SURGERY      GASTRIC BAND      WISDOM TOOTH EXTRACTION         Allergies   Allergen Reactions    Pistachio [Macadamia Nut Oil] Hives    Topiramate     Lansoprazole Rash       Social History:  Reviewed. reports that he has never smoked. He has never used smokeless tobacco. He reports that he does not drink alcohol and does not use drugs. Family History:  Reviewed. family history is not on file. No premature CAD. Review of System:  All other systems reviewed except for that noted above. Pertinent negatives and positives are:     General: negative for fever, chills   Ophthalmic ROS: negative for - eye pain or loss of vision  ENT ROS: negative for - headaches, sore throat   Respiratory: negative for - cough, sputum  Cardiovascular: Reviewed in HPI  Gastrointestinal: negative for - abdominal pain, diarrhea, N/V  Hematology: negative for - bleeding, blood clots, bruising or jaundice  Genito-Urinary:  negative for - Dysuria or incontinence  Musculoskeletal: negative for - Joint swelling, muscle pain  Neurological: negative for - confusion, dizziness, headaches   Psychiatric: No anxiety, no depression.   Dermatological: negative for - rash    Physical Examination:  Vitals:    03/15/23 2030   BP: 117/60   Pulse: (!) 102   Resp: 18   Temp: 98.4 °F (36.9 °C)   SpO2: 98% Intake/Output Summary (Last 24 hours) at 3/15/2023 2053  Last data filed at 3/15/2023 2040  Gross per 24 hour   Intake 600 ml   Output 2125 ml   Net -1525 ml     In: 600 [P.O.:600]  Out: 2125    Wt Readings from Last 3 Encounters:   03/15/23 255 lb (115.7 kg)   21 (!) 390 lb (176.9 kg) (>99 %, Z= 3.73)*   16 (!) 290 lb (131.5 kg) (>99 %, Z= 3.71)*     * Growth percentiles are based on CDC (Boys, 2-20 Years) data. Temp  Av.9 °F (36.6 °C)  Min: 97 °F (36.1 °C)  Max: 98.4 °F (36.9 °C)  Pulse  Av.4  Min: 96  Max: 181  BP  Min: 113/60  Max: 156/97  SpO2  Av.3 %  Min: 96 %  Max: 100 %    Telemetry: Atrial fibrillation with borderline RVR   Constitutional: Alert. Oriented to person, place, and time. No distress. Head: Normocephalic and atraumatic. Mouth/Throat: Lips appear moist. Oropharynx is clear and moist.  Eyes: Conjunctivae normal. EOM are normal.   Neck: Neck supple. No lymphadenopathy. No rigidity. No JVD present. Cardiovascular: Mildly tachycardic. Irregular rate and rhythm. Grade 2 out of 6 systolic and 1 out of 4 diastolic murmur. Pulmonary/Chest: Bilateral respiratory sounds present. No respiratory accessory muscle use. Abdominal: Soft. Normal bowel sounds present. No distension, No tenderness. No splenomegaly. No hernia. Musculoskeletal: No tenderness. No edema    Neurological: Alert and oriented. Cranial nerve II-XII grossly intact. Skin: Skin is warm and dry. No rash, lesions, ulcerations noted. Psychiatric: No anxiety nor agitation. Labs:  Reviewed.    Recent Labs     03/15/23  0843      K 3.6   CL 99   CO2 24   BUN 10   CREATININE 0.8*     Recent Labs     03/15/23  0843   WBC 8.3   HGB 17.3   HCT 51.8   MCV 85.1        Lab Results   Component Value Date/Time    TROPONINI <0.01 03/15/2023 05:05 PM     No results found for: BNP  No results found for: PROTIME, INR  No results found for: CHOL, HDL, TRIG    Diagnostic and imaging results reviewed. ECG: Atrial fibrillation with RVR. Nonspecific T wave changes. No sign of ongoing ischemia. Echo: 10/03/2022  Summary:      1. There is a small sub-aortic ridge creating mild obstruction, peak gradient of 13 mmHg, mean gradient ~8 mmHg. 2. Mild aortic valve regurgitation. 3. 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 Septal leaflet appears to have limited excursion. 4. Mild tricuspid valve regurgitation. 5. Right ventricular hypertension. 6. Right ventricle is normal in size and the systolic function is normal.    7. Left ventricle is normal in size and the systolic function is normal.    8. There is no significant pericardial effusion. 9. Compared to the previous echocardiogram of 4/16/2021, the gradient through the LVOT has slightly decreased. I independently reviewed the ECG and telemetry.     Scheduled Meds:   sodium chloride flush  5-40 mL IntraVENous 2 times per day    lisinopril  20 mg Oral Daily    rivaroxaban  20 mg Oral Daily    dilTIAZem  120 mg Oral BID     Continuous Infusions:   dilTIAZem (CARDIZEM) 100 mg in 0.9% sodium chloride 100 mL (ADD-[place]) 15 mg/hr (03/15/23 1856)    dextrose      sodium chloride       PRN Meds:.glucose, dextrose bolus **OR** dextrose bolus, glucagon (rDNA), dextrose, sodium chloride flush, sodium chloride, ondansetron **OR** ondansetron, polyethylene glycol, acetaminophen **OR** acetaminophen, perflutren lipid microspheres     Assessment:   Patient Active Problem List    Diagnosis Date Noted    Diabetes mellitus ([address] 03/15/2023    Atrial fibrillation with rapid ventricular response ([facility] [address] 03/15/2023      Active Hospital Problems    Diagnosis Date Noted    Diabetes mellitus ([address] [E11.9] 03/15/2023     Priority: Medium    Atrial fibrillation with rapid ventricular response [facility]) [I48.91] 03/15/2023     Priority: Medium     Mr. Truman Jackman is a pleasant 23year old male with a  medical history significant for congenital heart disease (dysplastic tricuspid valve, right ventricular hypertension, LVOT sub-aortic ridge, and ventricular septal defect), hypertension, diabetes mellitus type II, morbid obesity status post gastric bypass, fatty liver, and obstructive sleep apnea who presents from home with new onset atrial fibrillation with RVR. Problem List:  1. New onset atrial fibrillation with RVR ([id number] score 2). 2. Congenital heart disease. 3. Hypertension. 4. History of diabetes mellitus type II. Assessment and Plan:  1. New onset atrial fibrillation with RVR (BPRLA8REGf score 2). Patient is a pleasant 80-year-old male with a medical history significant for hypertension, history of diabetes mellitus type 2, congenital heart disease, and obstructive sleep apnea who presents from home with new onset symptomatic atrial fibrillation with RVR. Given his history of congenital heart disease and dysplastic tricuspid valve I am curious to know whether or not he has preexcitation on his baseline EKG. We will hopefully be able to tell based on EKG tomorrow for not able to get some from [place] [id number].    Afib risk factors including age, HTN, obesity, inactivity and ELEUTERIO were discussed with patient. Risk factor modification recommended. Patient's IAFYV2YNCn (score is 2 with a stroke risk of 2.2%. We discussed oral anticoagulation to decrease the risk of thromboembolic events including stroke. Benefits and alternatives were discussed with patient. Risk of bleeding was discussed. Patient verbalized understanding. Different forms of anticoagulants including warfarin (Coumadin), Dabigatran (Pradaxa), Rivaroxaban (Xarelto), and Apixaban (Eliquis) were discussed. Patient opted to start with rivaroxaban. Rate control strategy options including cardioversion, atrial fibrillation ablation, pacemaker with AVN ablation, anti-arrhythmic strategy, and rate control strategy were discussed with patient. Risks, benefits and alternative of each treatment options were explained. All questions were answered. Patient has opted for NORMA cardioversion (first episode, also discussed ILR given age if no recurrence). - Transition to PO diltiazem. Hold for MAP < 60 or heart rate less than 60, thanks. - I will order hemoglobin A1c.  - NPO at midnight.  - NORMA cardioversion tomorrow. - Start rivaroxaban and stop lovenox. - Four week cardiac monitor at time of discharge. - Outpatient referral to pulmonology for sleep apnea testing. 2. Congenital heart disease.  - Continue to follow with [facility].    3. Hypertension.  - May need to wean lisinopril pending to hold. 4. History of diabetes mellitus type II.  - A1C. Thank you for allowing me to participate in the care of [facility] . If you have any questions/comments, please do not hesitate to contact us.     Stewart Garsia MD  Cardiac Electrophysiology  [address] [facility]  [phone] [facility]

## 2023-03-16 NOTE — TELEPHONE ENCOUNTER
Pt stated that he saw agk while in the hospital and was started on Diltiazem. pt stated that his pharmacy does not have that medication in stock and needs it to go to [facility] in [facility] [phone]. Pt stated that he is suppose to start taking this medication in the morning.

## 2023-03-16 NOTE — PROGRESS NOTES
4 week cardiac event monitor activated. Education provided. Pt verbalized understanding. All questions addressed.

## 2023-03-16 NOTE — DISCHARGE INSTRUCTIONS
FOLLOW-UP APPOINTMENTS    [facility]ICE - Appointment on May 25th at 8:30am with Chilo Conde MD, electrophysiologist, [facility]. [facility],  [facility], [address] [facility] [address] [facility] [address], [address], [address] [facility]. Office #: [phone]. If you are unable to make this appointment, please call to reschedule. Directions to [place] [address] towards [place]. [address] [facility] exit. Right off exit. Cross over [employer]. Right on [address]. Left into hospital. Follow the signs to the emergency room ( turn left toward the Emergency room). Go right at the first stop sign. Just past the Emergency room at the second stop sign turn right and go up the ramp and park on the top level if possible. Go in the glass doors of the [facility] we on the top level of the garage [address]. As soon as you get in the door turn left and our office is the one with the glass doors. [employer] MONITOR PATCH PATIENT INSTRUCTIONS    Remove Patch in 1 week from application. Recalibrate and apply next patch. Call  [employer] CUSTOMER SUPPORT: [phone] and they will assist if needed. o PIN: [id number]    o Keep the phone with you as much as possible. Be sure to charge the phone overnight and when the battery gets low. If the phone dies completely, be sure to restart the phone and enter the PIN number to confirm the patch is connected to your phone.    o If you are away from the phone for more than 8 hours, please stay within a 30 foot range of the phone for 3 hours to ensure your data fully uploads. o If you go somewhere with no cellphone service, still keep the phone with you and charged. Your data will upload when you reach cellphone service. You can stay outside of cellphone range for up to 10 days. o This phone cannot make calls, take pictures, etc. It is solely for medical use and will only Bluetooth connect to your patch.     · Patch Instructions:    o Wait to exercise or shower for 30 minutes after application. o Shower with the water stream to your back and avoid putting the patch directly under the water stream.    o Do not bathe, swim or fully submerge the patch.    o If the patch starts to lift off after showering or sweating, dry the patch with a towel and allow the adhesive to dry. It should re-adhere to your skin. If it continues to lift, use the adhesive overlay to re-adhere the patch. Video instructions for the adhesive overlay are on the phone under the Menu Tab in the right hand corner - [address]    o One patch lasts for up to 7 days. If you need to wear an additional patch or replace a patch, utilize the included application instructions or the video instructions - Menu - Help - [employer] Application    · End of Wear - Returning the Phone    o Guerda Dawn are responsible for returning the phone. You will be financially responsible for an unreturned phone. o Discard the used patch in the trash    o Return the phone,  and any unopened additional patches or adhesives in the pre-paid mailing pouch or drop off at the office. o Drop the  at [address] [place] [address] location or box or schedule a home  by calling    ups - [phone]. Keep the tracking number for your records.     CONTACT [employer] CUSTOMER SUPPORT:  [phone]

## 2023-03-16 NOTE — PROGRESS NOTES
Writer requested into pt's room by pt and parents. Concerns verbalized about not undergoing NORMA/CV. Writer explained that since pt is now in NSR, NORMA/CV is no longer necessary. Pt concerned about heart valves and the damage that atrial fibrillation has caused on heart valves and requesting to have an echocardiogram done before he leaves for reassurance. Joy Whelan CNP with EP made aware in order to address pt and family concerns.

## 2023-03-16 NOTE — PROGRESS NOTES
[facility]     Electrophysiology                                     Progress Note    Admission date:  3/15/2023    Reason for follow up visit: AF    HPI/CC: Que Macias was admitted on 3/15/2023 with palpitations, lightheadedness and dizziness. He was found to have new onset AF. This morning he went into SVT with conversion to SR following vagal maneuvers. Rhythm has been SR with HR in the 60's-80's. Subjective: He is seen with his family and girlfriend at bedside. He continues to feel dizzy with shortness of breath. He feels this may be related to lack of food today. He did not feel short of breath with ambulation to the bathroom. He denies chest pain. Vitals:  Blood pressure 108/62, pulse 73, temperature 98.8 °F (37.1 °C), temperature source Oral, resp. rate 16, height 6' 3\" (1.905 m), weight 255 lb (115.7 kg), SpO2 98 %.   Temp  Av.1 °F (36.7 °C)  Min: 97.5 °F (36.4 °C)  Max: 98.8 °F (37.1 °C)  Pulse  Av.1  Min: 62  Max: 210  BP  Min: 97/59  Max: 141/68  SpO2  Av.8 %  Min: 98 %  Max: 100 %    24 hour I/O    Intake/Output Summary (Last 24 hours) at 3/16/2023 1248  Last data filed at 3/16/2023 1227  Gross per 24 hour   Intake 600 ml   Output 2575 ml   Net -1975 ml     Current Facility-Administered Medications   Medication Dose Route Frequency Provider Last Rate Last Admin    sodium chloride flush 0.9 % injection 5-40 mL  5-40 mL IntraVENous 2 times per day Hugo Olmstead MD        sodium chloride flush 0.9 % injection 5-40 mL  5-40 mL IntraVENous PRN Hugo Olmstead MD        0.9 % sodium chloride infusion   IntraVENous PRN Hugo Olmstead MD        LORazepam (ATIVAN) tablet 0.5 mg  0.5 mg Oral Once PRN Hugo Olmstead MD        dilTIAZem 100 mg in sodium chloride 0.9 % 100 mL infusion ([facility])  2.5-15 mg/hr IntraVENous Continuous Shan Blizzard, MD   Stopped at 03/16/23 0119    glucose chewable tablet 16 g  4 tablet Oral PRN Shan Blizzard, MD dextrose bolus 10% 125 mL  125 mL IntraVENous PRN Cass Chang MD        Or    dextrose bolus 10% 250 mL  250 mL IntraVENous PRN Cass Chang MD        glucagon (rDNA) injection 1 mg  1 mg SubCUTAneous PRN Catherine Leos MD        dextrose 10 % infusion   IntraVENous Continuous PRN Cass Chang MD        sodium chloride flush 0.9 % injection 5-40 mL  5-40 mL IntraVENous 2 times per day Cass Chang MD   10 mL at 03/15/23 2035    sodium chloride flush 0.9 % injection 5-40 mL  5-40 mL IntraVENous PRN Catherine Leos MD        0.9 % sodium chloride infusion   IntraVENous PRN Cass Chang MD        ondansetron (ZOFRAN-ODT) disintegrating tablet 4 mg  4 mg Oral Q8H PRN Cass Chang MD        Or    ondansetron (ZOFRAN) injection 4 mg  4 mg IntraVENous Q6H PRN Catherine Leos MD        polyethylene glycol (GLYCOLAX) packet 17 g  17 g Oral Daily PRN Cass Chang MD        acetaminophen (TYLENOL) tablet 650 mg  650 mg Oral Q6H PRN Cass Chang MD   650 mg at 03/16/23 0751    Or    acetaminophen (TYLENOL) suppository 650 mg  650 mg Rectal Q6H PRN Cass Chang MD        perflutren lipid microspheres (DEFINITY) injection 1.5 mL  1.5 mL IntraVENous ONCE PRN Cass Chang MD        lisinopril (PRINIVIL;ZESTRIL) tablet 20 mg  20 mg Oral Daily Cass Chang MD   20 mg at 03/16/23 5546    rivaroxaban (XARELTO) tablet 20 mg  20 mg Oral Daily J Walter Dakin., MD   20 mg at 03/16/23 9264    dilTIAZem (CARDIZEM CD) extended release capsule 120 mg  120 mg Oral BID Oscar Ashford MD   120 mg at 03/16/23 5865       Objective:     Telemetry monitor: SR    Physical Exam:  Constitutional and general appearance: alert, cooperative, no distress, and appears stated age  [de-identified]: Normal oral mucosa  Respiratory:  Normal excursion and expansion without use of accessory muscles  Resp auscultation: Normal breath sounds without wheezing, rhonchi, and rales  Cardiovascular:  Heart tones are crisp and normal. regular S1 and S2.  Jugular venous pulsation Normal  Peripheral pulses are symmetrical and full   Abdomen:  No masses or tenderness  Bowel sounds present  Extremities:   No cyanosis or clubbing   No lower extremity edema   Skin: warm and dry  Neurological:  Alert and oriented  Moves all extremities well  No abnormalities of mood, affect, memory, mentation, or behavior are noted    Data  Echo: 10/03/2022  Summary:      1. There is a small sub-aortic ridge creating mild obstruction, peak gradient of 13 mmHg, mean gradient ~8 mmHg. 2. Mild aortic valve regurgitation. 3. 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 Septal leaflet appears to have limited excursion. 4. Mild tricuspid valve regurgitation. 5. Right ventricular hypertension. 6. Right ventricle is normal in size and the systolic function is normal.    7. Left ventricle is normal in size and the systolic function is normal.    8. There is no significant pericardial effusion. 9. Compared to the previous echocardiogram of 4/16/2021, the gradient through the LVOT has slightly decreased. All labs and testing reviewed.   Lab Review     Renal Profile:   Lab Results   Component Value Date/Time    CREATININE 0.9 03/16/2023 06:28 AM    BUN 11 03/16/2023 06:28 AM     03/16/2023 06:28 AM    K 3.9 03/16/2023 06:28 AM     03/16/2023 06:28 AM    CO2 29 03/16/2023 06:28 AM     CBC:    Lab Results   Component Value Date/Time    WBC 8.3 03/15/2023 08:43 AM    RBC 6.08 03/15/2023 08:43 AM    HGB 17.3 03/15/2023 08:43 AM    HCT 51.8 03/15/2023 08:43 AM    MCV 85.1 03/15/2023 08:43 AM    RDW 13.5 03/15/2023 08:43 AM     03/15/2023 08:43 AM     BNP:  No results found for: BNP  Fasting Lipid Panel:    Lab Results   Component Value Date/Time    CHOL 174 03/15/2023 01:15 PM    HDL 59 03/15/2023 01:15 PM    TRIG 117 03/15/2023 01:15 PM     Cardiac Enzymes:  CK/MbTroponin  Lab Results   Component Value Date/Time Kaya Dye <0.01 03/15/2023 05:05 PM     PT/ INR   Lab Results   Component Value Date/Time    INR 1.02 03/16/2023 06:28 AM    PROTIME 13.3 03/16/2023 06:28 AM     PTT No results found for: PTT No results found for: MG No results found for: TSH    Assessment:  Paroxysmal atrial fibrillation: stable   -[id number] score: 1 (HTN)   SVT: symptomatic   -brief before conversion to SR  CHD: dysplastic tricuspid valve, RV HTN, LVOT sub-aortic ridge, and ventricular septal defect  HTN: controlled   DM   Obesity s/p gastric bypass  Fatty liver       Plan:   1.  NORMA/DCCV cancelled due to conversion to sinus rhythm  2. Outpatient EP Study discussed and recommended. He will consider this option. 3. Continue Xarelto and Cardizem   4. Okay to hold lisinopril at discharge due to addition of Cardizem   5. 4 week cardiac monitor ordered at discharge  6. Will arrange 2 month office visit with Dr. Gerard Luu    7. If patient feels better after eating lunch, okay for discharge home from an EP standpoint      Discussed plan of care with Dr. Gerard Luu.         FRANCO Garcia-CNP  [facility]  [phone]

## 2023-03-16 NOTE — PROGRESS NOTES
Pt's HR sustaining 170's-200's. Pt instructed to blow into straw. Pt's HR now sustaining 70's-80's. P waves noticeable on tele monitor. EKG ordered to confirm rhythm. Cardiology called to make aware and questing xarelto administration this morning prior to NORMA/CV.

## 2023-03-17 ENCOUNTER — TELEPHONE (OUTPATIENT)
Dept: CARDIOLOGY CLINIC | Age: 23
End: 2023-03-17

## 2023-03-17 NOTE — TELEPHONE ENCOUNTER
Pt was seen in the hospital by suha and was started on Xarelto 20mg. Pts dad was advised that he will need a pa for the medication. Pt is suppose to start xarelto on 03/17/2023. Pt wants to fill with [facility] [phone]. Do we have samples?

## 2023-03-17 NOTE — TELEPHONE ENCOUNTER
[facility] office unfortunately is out of Xarelto samples. Spoke with staff at the [place] office and they are able to provide samples for patient. Called and notified patient's mother, Elissa Dancer, that samples would be available at the [place] office. She was provided with address to that office and gave V/U stating they would pick them up today.

## 2023-03-17 NOTE — TELEPHONE ENCOUNTER
Pt was seen in the hospital by suha and was started on Xarelto 20mg. Pts dad was advised that he will need a pa for the medication. Pt is suppose to start xarelto on 03/17/2023. Pt wants to fill with [facility] [phone]. Do we have samples?

## 2023-03-29 ENCOUNTER — TELEPHONE (OUTPATIENT)
Dept: CARDIOLOGY CLINIC | Age: 23
End: 2023-03-29

## 2023-03-29 NOTE — TELEPHONE ENCOUNTER
Two things: 1. Pt called asking about his PA for his xarelto. I might be overlooking in pt's chart but I do not see anything about pt's PA. He informed me this has been going on for two weeks. He was first prescribed the xarelto while hospitalized by [address] [place] [address],[address]. Please advise pt's PA for xarelto and call him at [phone]. 2.Also please tod in pt's chart he was provided xarelto samples from the [facility].  Information about samples below:    Xarelto 20mg  Lot#: [id number]  Exp: 4/25  2 bottles given

## 2023-04-28 PROCEDURE — 93228 REMOTE 30 DAY ECG REV/REPORT: CPT | Performed by: INTERNAL MEDICINE

## 2023-05-03 ENCOUNTER — TELEPHONE (OUTPATIENT)
Dept: CARDIOLOGY CLINIC | Age: 23
End: 2023-05-03

## 2023-05-25 DIAGNOSIS — R55 NEAR SYNCOPE: ICD-10-CM

## 2023-05-25 DIAGNOSIS — R42 LIGHTHEADEDNESS: ICD-10-CM

## 2023-05-25 DIAGNOSIS — I48.91 ATRIAL FIBRILLATION WITH RVR (HCC): ICD-10-CM

## 2024-07-05 ENCOUNTER — OFFICE VISIT (OUTPATIENT)
Dept: URGENT CARE | Age: 24
End: 2024-07-05

## 2024-07-05 VITALS
HEART RATE: 101 BPM | HEIGHT: 75 IN | DIASTOLIC BLOOD PRESSURE: 87 MMHG | TEMPERATURE: 101.1 F | OXYGEN SATURATION: 98 % | WEIGHT: 282 LBS | BODY MASS INDEX: 35.06 KG/M2 | SYSTOLIC BLOOD PRESSURE: 137 MMHG

## 2024-07-05 DIAGNOSIS — R50.9 FEVER, UNSPECIFIED FEVER CAUSE: ICD-10-CM

## 2024-07-05 DIAGNOSIS — R05.9 COUGH, UNSPECIFIED TYPE: Primary | ICD-10-CM

## 2024-07-05 DIAGNOSIS — U07.1 COVID: ICD-10-CM

## 2024-07-05 LAB
INFLUENZA VIRUS A RNA: NEGATIVE
INFLUENZA VIRUS B RNA: NEGATIVE
Lab: ABNORMAL
QC PASS/FAIL: ABNORMAL
SARS-COV-2 RDRP RESP QL NAA+PROBE: POSITIVE
STREPTOCOCCUS A RNA: NEGATIVE

## 2024-07-05 RX ORDER — METOPROLOL SUCCINATE 25 MG/1
25 TABLET, EXTENDED RELEASE ORAL 2 TIMES DAILY
COMMUNITY
Start: 2024-05-13

## 2024-07-05 RX ORDER — ALBUTEROL SULFATE 90 UG/1
2 AEROSOL, METERED RESPIRATORY (INHALATION) 4 TIMES DAILY PRN
Qty: 18 G | Refills: 0 | Status: SHIPPED | OUTPATIENT
Start: 2024-07-05

## 2024-07-05 RX ORDER — FLECAINIDE ACETATE 100 MG/1
100 TABLET ORAL 2 TIMES DAILY
COMMUNITY
Start: 2024-05-13

## 2024-07-05 ASSESSMENT — ENCOUNTER SYMPTOMS
EYE DISCHARGE: 0
EYE PAIN: 0
COUGH: 1
ABDOMINAL PAIN: 0
SINUS PRESSURE: 0
EYE REDNESS: 0
SHORTNESS OF BREATH: 1
DIARRHEA: 0
VOMITING: 0
NAUSEA: 0
SORE THROAT: 1

## 2024-07-05 NOTE — PATIENT INSTRUCTIONS
- Pt to drink lots of fluids  - Pt to take medication as prescribed  - Pt ok to take tylenol and ibuprofen as needed  - Pt to call if any symptoms worsen or follow up with PCP  - Pt to go to ER if have shortness of breath or chest pain  - Pt to isolate for 5 days from symptoms start and wear a mask for 5 days after that if go out in public

## 2024-07-05 NOTE — PROGRESS NOTES
Pupils: Pupils are equal, round, and reactive to light.   Cardiovascular:      Rate and Rhythm: Regular rhythm. Tachycardia present.      Pulses: Normal pulses.      Heart sounds: Normal heart sounds.   Pulmonary:      Effort: Pulmonary effort is normal. No tachypnea, bradypnea, accessory muscle usage, prolonged expiration, respiratory distress or retractions. He is not intubated.      Breath sounds: Normal breath sounds and air entry. No stridor, decreased air movement or transmitted upper airway sounds. No decreased breath sounds, wheezing, rhonchi or rales.   Chest:      Chest wall: No tenderness.   Musculoskeletal:      Cervical back: Normal range of motion and neck supple.   Lymphadenopathy:      Cervical: No cervical adenopathy.   Skin:     General: Skin is warm and dry.   Neurological:      General: No focal deficit present.      Mental Status: He is alert and oriented to person, place, and time.   Psychiatric:         Mood and Affect: Mood normal.         Behavior: Behavior normal.         Thought Content: Thought content normal.         Judgment: Judgment normal.       PROCEDURES:  Unless otherwise noted below, none     Procedures    RESULTS:  Results for POC orders placed in visit on 07/05/24   POCT Rapid Strep A DNA (Alere i)   Result Value Ref Range    Streptococcus A RNA Negative    POCT Influenza A/B DNA (Alere i)   Result Value Ref Range    Influenza virus A RNA Negative     Influenza virus B RNA Negative      SARS-COV-2, RdRp gene (no units)   Date Value   07/05/2024 Positive (A)       An electronic signature was used to authenticate this note.    --Jayne Rubio PA-C

## 2024-08-08 ENCOUNTER — OFFICE VISIT (OUTPATIENT)
Dept: URGENT CARE | Age: 24
End: 2024-08-08

## 2024-08-08 VITALS
WEIGHT: 287 LBS | HEART RATE: 73 BPM | HEIGHT: 75 IN | DIASTOLIC BLOOD PRESSURE: 76 MMHG | OXYGEN SATURATION: 99 % | SYSTOLIC BLOOD PRESSURE: 124 MMHG | BODY MASS INDEX: 35.68 KG/M2 | TEMPERATURE: 98.2 F

## 2024-08-08 DIAGNOSIS — J06.9 VIRAL URI: ICD-10-CM

## 2024-08-08 DIAGNOSIS — R03.0 ELEVATED BP WITHOUT DIAGNOSIS OF HYPERTENSION: ICD-10-CM

## 2024-08-08 DIAGNOSIS — J02.9 SORE THROAT: Primary | ICD-10-CM

## 2024-08-08 LAB — S PYO AG THROAT QL: NORMAL

## 2024-08-08 RX ORDER — CETIRIZINE HYDROCHLORIDE 10 MG/1
10 TABLET ORAL DAILY
Qty: 30 TABLET | Refills: 0 | Status: SHIPPED | OUTPATIENT
Start: 2024-08-08

## 2024-08-08 ASSESSMENT — ENCOUNTER SYMPTOMS
COUGH: 0
SHORTNESS OF BREATH: 0
EYE DISCHARGE: 0
EYE REDNESS: 0
EYE PAIN: 0
ABDOMINAL PAIN: 0
DIARRHEA: 0
SORE THROAT: 1
VOMITING: 0
NAUSEA: 0

## 2024-08-08 NOTE — PROGRESS NOTES
Richard Mtz (: 2000) is a 24 y.o. male, Established patient, here for evaluation of the following chief complaint(s):  Pharyngitis (2 days. Pt states cold chills. Hurts to swallow. Exposed to strep at work)      ASSESSMENT/PLAN:    ICD-10-CM    1. Sore throat  J02.9 POCT rapid strep A      2. Viral URI  J06.9 cetirizine (ZYRTEC) 10 MG tablet      3. Elevated BP without diagnosis of hypertension  R03.0 Non [facility] - External Referral To Primary Care          - Pt just wanted a rapid strep test done. Pt is negative for strep. Low concern for strep pharyngitis, otitis media, bacterial pneumonia, bronchitis, sinusitis or sepsis.  - Pt to drink lots of fluids  - Pt to take medication as prescribed  - Pt ok to take tylenol and ibuprofen as needed  - Pt to call if any symptoms worsen or follow up with PCP if not better in 7 days  - Pt to go to ER if have shortness of breath or chest pain    Discussed PCP follow up for persisting or worsening symptoms, or to return to the clinic if unable to obtain PCP follow up for worsening symptoms.    The patient tolerated their visit well. The patient and/or the family were informed of the results of any tests, a time was given to answer questions, a plan was proposed and they agreed with plan. Reviewed AVS with treatment instructions and answered questions - pt/family expresses understanding and agreement with the discussed treatment plan and AVS instructions.      SUBJECTIVE/OBJECTIVE:  HPI:   24 y.o. male presents for complaint of pt states he started 2 days ago with a sore throat.    Admits chills.   Denies fever, body aches, headache, ear pain, nasal congestion, cough, abdominal pain, vomiting or diarrhea.     Has not taken any medication at home. Pt states he has been exposed to a co worker that had tested positive for strep throat. Pt states he tested positive for covid in 2 months ago.        History provided by:  Patient   used: No

## 2024-11-05 ENCOUNTER — OFFICE VISIT (OUTPATIENT)
Dept: URGENT CARE | Age: 24
End: 2024-11-05

## 2024-11-05 VITALS
DIASTOLIC BLOOD PRESSURE: 79 MMHG | BODY MASS INDEX: 36.06 KG/M2 | WEIGHT: 290 LBS | HEART RATE: 71 BPM | HEIGHT: 75 IN | SYSTOLIC BLOOD PRESSURE: 119 MMHG | TEMPERATURE: 98.3 F | OXYGEN SATURATION: 97 %

## 2024-11-05 DIAGNOSIS — B96.89 ACUTE BACTERIAL SINUSITIS: Primary | ICD-10-CM

## 2024-11-05 DIAGNOSIS — R09.82 POSTNASAL DRIP: ICD-10-CM

## 2024-11-05 DIAGNOSIS — R05.1 ACUTE COUGH: ICD-10-CM

## 2024-11-05 DIAGNOSIS — R09.81 NASAL CONGESTION: ICD-10-CM

## 2024-11-05 DIAGNOSIS — J01.90 ACUTE BACTERIAL SINUSITIS: Primary | ICD-10-CM

## 2024-11-05 PROBLEM — Z87.74 ADULT PATIENT WITH HISTORY OF CONGENITAL HEART DISEASE: Chronic | Status: ACTIVE | Noted: 2024-11-05

## 2024-11-05 PROBLEM — E66.811 OBESITY (BMI 30.0-34.9): Status: ACTIVE | Noted: 2022-04-22

## 2024-11-05 PROBLEM — M79.673 FOOT PAIN: Status: RESOLVED | Noted: 2018-02-12 | Resolved: 2024-11-05

## 2024-11-05 PROBLEM — Q22.8: Status: ACTIVE | Noted: 2023-03-21

## 2024-11-05 PROBLEM — G47.33 OBSTRUCTIVE SLEEP APNEA SYNDROME: Chronic | Status: ACTIVE | Noted: 2019-01-14

## 2024-11-05 PROBLEM — Q24.4 SUBVALVULAR AORTIC STENOSIS: Status: ACTIVE | Noted: 2023-05-01

## 2024-11-05 PROBLEM — R80.9 MICROALBUMINURIA: Status: RESOLVED | Noted: 2020-11-19 | Resolved: 2024-11-05

## 2024-11-05 PROBLEM — I48.0 PAROXYSMAL ATRIAL FIBRILLATION (HCC): Status: ACTIVE | Noted: 2023-03-15

## 2024-11-05 PROBLEM — K75.81 NASH (NONALCOHOLIC STEATOHEPATITIS): Status: ACTIVE | Noted: 2021-06-16

## 2024-11-05 PROBLEM — Z79.899 HIGH RISK MEDICATION USE: Status: RESOLVED | Noted: 2024-04-29 | Resolved: 2024-11-05

## 2024-11-05 RX ORDER — AZITHROMYCIN 250 MG/1
250 TABLET, FILM COATED ORAL DAILY
COMMUNITY
Start: 2024-11-02 | End: 2024-11-07

## 2024-11-05 RX ORDER — GUAIFENESIN 600 MG/1
600 TABLET, EXTENDED RELEASE ORAL 2 TIMES DAILY
Qty: 20 TABLET | Refills: 0 | Status: SHIPPED | OUTPATIENT
Start: 2024-11-05 | End: 2024-11-15

## 2024-11-05 RX ORDER — UBROGEPANT 100 MG/1
TABLET ORAL
COMMUNITY
Start: 2024-10-16

## 2024-11-05 RX ORDER — DEXTROMETHORPHAN HBR AND PYRILAMINE MALEATE 30; 30 MG/1; MG/1
1 TABLET ORAL 4 TIMES DAILY PRN
Qty: 40 TABLET | Refills: 0 | Status: SHIPPED | OUTPATIENT
Start: 2024-11-05

## 2024-11-05 RX ORDER — ASPIRIN 81 MG
1 TABLET, DELAYED RELEASE (ENTERIC COATED) ORAL DAILY
COMMUNITY

## 2024-11-05 ASSESSMENT — VISUAL ACUITY: OU: 1

## 2024-11-05 NOTE — PROGRESS NOTES
Procedures    RESULTS:  No results found for this visit on 11/05/24.  An electronic signature was used to authenticate this note.    --Viraj Whitney PA-C

## 2024-11-05 NOTE — PATIENT INSTRUCTIONS
Augmentin is prescribed for antibiotic treatment of the sinus infection  Take the antibiotics until completed, do not stop unless otherwise directed by a healthcare provider.  Recommend daily yogurt or other probiotics while on antibiotics.  Charlotte DMT prescribed for cough and congestion relief.  Guaifenesin (Mucinex) prescribed for expectorant therapy.  Recommend OTC treatment for symptoms:  Ibuprofen (Advil, Motrin) and acetaminophen (Tylenol) for fevers and pain relief.  antihistamines (Claritin, Zyrtec, Allegra, or Xyzal) and nasal steroid sprays (Flonase) to help with nasal congestion and runny nose.  throat sprays (Cepacol, chloraseptic) for throat pain.  dextromethorphan (Robitussin, Delsym), throat lozenges, and increased water intake for cough.  honey (1-2 teaspoons every hour) for relief of throat irritation and coughing fits.  warm teas, humidifiers, nasal lavages, and sleeping in an inclined position are also helpful options that can lessen symptoms.  Recommend warm compresses over the symptomatic ear(s) for 10-15 minutes, or a hot shower, followed by 1-2 minutes of massaging the area behind your ears and down the jaw-line to help with the ear congestion/ear pressure.    Follow up with your PCP within 5 days or, if unable, you can return to the clinic if symptoms persist beyond 5 days or if symptoms worsen.  If you develop shortness of breath, increased work of breathing, lightheadedness, or chest pain, contact 911, or follow up immediately with the nearest Emergency Department for further evaluation.    New Prescriptions    AMOXICILLIN-CLAVULANATE (AUGMENTIN) 875-125 MG PER TABLET    Take 1 tablet by mouth 2 times daily for 7 days    DEXTROMETHORPHAN-PYRILAMINE (CAPRON DMT) 30-30 MG TABS    Take 1 tablet by mouth 4 times daily as needed (cough and congestion)    GUAIFENESIN (MUCINEX) 600 MG EXTENDED RELEASE TABLET    Take 1 tablet by mouth 2 times daily for 10 days

## 2025-07-26 ENCOUNTER — OFFICE VISIT (OUTPATIENT)
Dept: URGENT CARE | Age: 25
End: 2025-07-26

## 2025-07-26 VITALS
SYSTOLIC BLOOD PRESSURE: 142 MMHG | OXYGEN SATURATION: 98 % | BODY MASS INDEX: 37.87 KG/M2 | TEMPERATURE: 97 F | HEART RATE: 71 BPM | DIASTOLIC BLOOD PRESSURE: 88 MMHG | WEIGHT: 303 LBS

## 2025-07-26 DIAGNOSIS — S90.01XA CONTUSION OF RIGHT ANKLE, INITIAL ENCOUNTER: ICD-10-CM

## 2025-07-26 DIAGNOSIS — S93.401A MILD SPRAIN OF RIGHT ANKLE, INITIAL ENCOUNTER: Primary | ICD-10-CM

## 2025-07-26 RX ORDER — IBUPROFEN 600 MG/1
600 TABLET, FILM COATED ORAL 3 TIMES DAILY PRN
Qty: 30 TABLET | Refills: 0 | Status: SHIPPED | OUTPATIENT
Start: 2025-07-26

## 2025-07-26 NOTE — PROGRESS NOTES
Richard Mtz (: 2000) is a 25 y.o. male, Established patient, here for evaluation of the following chief complaint(s):  Ankle Injury (2 days agp pt fell and his his ankle on a metal piece. Right ankle. Swollen. He also states there is a small cut from on this ankle from hitting it on the piece of metal )      ASSESSMENT/PLAN:  No diagnosis found.      Discussed PCP follow up for persisting or worsening symptoms, or to return to the clinic if unable to obtain PCP follow up for worsening symptoms.    Reviewed AVS with treatment instructions and answered questions - patient acknowledges understanding and agreement with the discussed treatment plan and AVS instructions.          SUBJECTIVE/OBJECTIVE:  Patient is here with an ankle injury of the right ankle. Patient was at work, and slipped on water the floor had been wet, patient hit inside of right ankle on metal shelving unit.  Patient has iced and elevated. Patient has also used Ibuprofen 600 mg.        VITAL SIGNS  Vitals:    25 1116   BP: (!) 143/88   Pulse: 71   Temp: 97 °F (36.1 °C)   TempSrc: Oral   SpO2: 98%   Weight: (!) 137.4 kg (303 lb)       Review of Systems   Musculoskeletal:  Positive for gait problem.        Ankle pain right. There is bruising and an abrasion on the medial aspect of the right ankle.       Pertinent positives and negatives as above, or may be included within the HPI.    Physical Exam  Vitals reviewed.   Constitutional:       General: He is not in acute distress.     Appearance: He is obese. He is not ill-appearing.   HENT:      Head: Normocephalic.   Eyes:      Extraocular Movements: Extraocular movements intact.   Cardiovascular:      Rate and Rhythm: Normal rate and regular rhythm.      Heart sounds: Murmur heard.      Comments: There is abnormal heart sound near tricuspid valve, patient sees cardiology and electrophysiologist.  Pulmonary:      Effort: Pulmonary effort is normal.      Breath sounds: Normal breath

## 2025-08-28 ENCOUNTER — OFFICE VISIT (OUTPATIENT)
Dept: URGENT CARE | Age: 25
End: 2025-08-28

## 2025-08-28 VITALS
BODY MASS INDEX: 38.5 KG/M2 | TEMPERATURE: 98 F | HEART RATE: 76 BPM | OXYGEN SATURATION: 97 % | WEIGHT: 308 LBS | SYSTOLIC BLOOD PRESSURE: 145 MMHG | DIASTOLIC BLOOD PRESSURE: 84 MMHG

## 2025-08-28 DIAGNOSIS — J02.9 SORE THROAT: Primary | ICD-10-CM

## 2025-08-28 LAB
INFLUENZA A ANTIGEN, POC: NORMAL
INFLUENZA B ANTIGEN, POC: NORMAL

## 2025-08-28 ASSESSMENT — ENCOUNTER SYMPTOMS
SORE THROAT: 1
COUGH: 1
GASTROINTESTINAL NEGATIVE: 1